# Patient Record
Sex: FEMALE | Race: WHITE | NOT HISPANIC OR LATINO | Employment: FULL TIME | URBAN - NONMETROPOLITAN AREA
[De-identification: names, ages, dates, MRNs, and addresses within clinical notes are randomized per-mention and may not be internally consistent; named-entity substitution may affect disease eponyms.]

---

## 2024-10-28 ENCOUNTER — OFFICE VISIT (OUTPATIENT)
Dept: SURGERY | Facility: CLINIC | Age: 39
End: 2024-10-28
Payer: MEDICAID

## 2024-10-28 VITALS — WEIGHT: 171.2 LBS | HEIGHT: 66 IN | BODY MASS INDEX: 27.51 KG/M2

## 2024-10-28 DIAGNOSIS — L72.3 SEBACEOUS CYST: Primary | ICD-10-CM

## 2024-10-28 PROCEDURE — 99202 OFFICE O/P NEW SF 15 MIN: CPT | Performed by: SURGERY

## 2024-10-28 PROCEDURE — 1159F MED LIST DOCD IN RCRD: CPT | Performed by: SURGERY

## 2024-10-28 PROCEDURE — 1160F RVW MEDS BY RX/DR IN RCRD: CPT | Performed by: SURGERY

## 2024-10-28 RX ORDER — NORETHINDRONE AND ETHINYL ESTRADIOL 1 MG-35MCG
1 KIT ORAL DAILY
COMMUNITY
Start: 2024-10-03

## 2024-11-18 ENCOUNTER — PROCEDURE VISIT (OUTPATIENT)
Dept: SURGERY | Facility: CLINIC | Age: 39
End: 2024-11-18
Payer: MEDICAID

## 2024-11-18 VITALS — BODY MASS INDEX: 27.48 KG/M2 | HEIGHT: 66 IN | WEIGHT: 171 LBS

## 2024-11-18 DIAGNOSIS — L72.3 SEBACEOUS CYST: Primary | ICD-10-CM

## 2024-11-18 PROCEDURE — 11403 EXC TR-EXT B9+MARG 2.1-3CM: CPT | Performed by: SURGERY

## 2024-11-18 PROCEDURE — 12032 INTMD RPR S/A/T/EXT 2.6-7.5: CPT | Performed by: SURGERY

## 2024-11-18 PROCEDURE — 11402 EXC TR-EXT B9+MARG 1.1-2 CM: CPT | Performed by: SURGERY

## 2024-11-18 NOTE — PROGRESS NOTES
"Subjective   Michelle Alarcon is a 39 y.o. female here today for procedure.    History of Present Illness  Ms. Alarcon was seen in the office today for excision of 2 noninfected sebaceous cysts of the skin of the right breast.  Allergies   Allergen Reactions    Sulfa Antibiotics Anaphylaxis    Penicillins Swelling    Shellfish Allergy Rash       Current Outpatient Medications   Medication Sig Dispense Refill    Nortrel 1/35, 28, 1-35 MG-MCG per tablet Take 1 tablet by mouth Daily.       No current facility-administered medications for this visit.     History reviewed. No pertinent past medical history.  Past Surgical History:   Procedure Laterality Date    APPENDECTOMY      TOE SURGERY      UTERINE SUSPENSION LAPAROSCOPIC         Pertinent Review of Systems    Objective   Ht 167.6 cm (66\")   Wt 77.6 kg (171 lb)   BMI 27.60 kg/m²    Physical Exam  Unchanged from 10/28/2024    Procedures   Procedure Note    Procedure: Excision sebaceous cysts    Location: Right breast at 7:00 and right breast at 5:00, sternal border    Anesthesia: 1% lidocaine with epinephrine    Description:  The risks and benefits of the procedure were discussed.  After prep with Betadine and infiltration with lidocaine an elliptical incision was made around each cyst and carried down into the subcu.  Cyst was excised in its entirety.  The lesion at 7:00 was closed with a 4-0 Monocryl and was 1.4 cm.  The lesion at the sternal border was 2.9 cm and closed in a 2 layer closure of interrupted 3-0 Vicryl subdermal sutures, followed by 4-0 Monocryl.  Dressings were placed.  Patient tolerated the procedure well    Complications:  none    Follow-up: As needed  Results/Data:      Assessment & Plan   Status post excision of 2 noninfected sebaceous cysts of the skin of the right breast       Discussion/Summary:    Time spent:     BMI is >= 25 and <30. (Overweight) The following options were offered after discussion;: nutrition counseling/recommendations   "     @[email or web address]    This document has been electronically signed by        November 18, 2024 12:23 EST    Please note that portions of this note were completed with a voice recognition program.